# Patient Record
Sex: FEMALE | Race: BLACK OR AFRICAN AMERICAN | NOT HISPANIC OR LATINO | Employment: OTHER
[De-identification: names, ages, dates, MRNs, and addresses within clinical notes are randomized per-mention and may not be internally consistent; named-entity substitution may affect disease eponyms.]

---

## 2024-01-05 PROBLEM — E11.9 NEW ONSET TYPE 2 DIABETES MELLITUS (MULTI): Status: ACTIVE | Noted: 2018-12-19

## 2024-01-05 PROBLEM — E66.3 OVERWEIGHT (BMI 25.0-29.9): Status: ACTIVE | Noted: 2020-09-17

## 2024-01-05 PROBLEM — R10.9 ABDOMINAL PAIN: Status: ACTIVE | Noted: 2018-11-06

## 2024-01-05 PROBLEM — N70.92 OVARIAN ABSCESS: Status: ACTIVE | Noted: 2018-11-09

## 2024-01-05 PROBLEM — Z98.890 S/P EXPLORATORY LAPAROTOMY: Status: ACTIVE | Noted: 2018-11-09

## 2024-01-05 PROBLEM — N83.202 CYST OF LEFT OVARY: Status: ACTIVE | Noted: 2018-11-06

## 2024-01-05 PROBLEM — D47.2 MONOCLONAL GAMMOPATHY: Status: ACTIVE | Noted: 2024-01-05

## 2024-01-05 PROBLEM — J45.21 MILD INTERMITTENT ASTHMA WITH ACUTE EXACERBATION (HHS-HCC): Status: ACTIVE | Noted: 2019-02-27

## 2024-01-05 PROBLEM — G47.33 OSA ON CPAP: Status: ACTIVE | Noted: 2018-10-17

## 2024-01-05 PROBLEM — N95.0 POSTMENOPAUSAL BLEEDING: Status: ACTIVE | Noted: 2019-03-25

## 2024-01-05 RX ORDER — MONTELUKAST SODIUM 10 MG/1
1 TABLET ORAL NIGHTLY
COMMUNITY
Start: 2019-12-03

## 2024-01-05 RX ORDER — IPRATROPIUM BROMIDE 21 UG/1
2 SPRAY, METERED NASAL
COMMUNITY
Start: 2019-12-03

## 2024-01-05 RX ORDER — ALBUTEROL SULFATE 90 UG/1
1-2 AEROSOL, METERED RESPIRATORY (INHALATION) EVERY 4 HOURS PRN
COMMUNITY
Start: 2021-01-12

## 2024-01-05 RX ORDER — ALBUTEROL SULFATE 0.83 MG/ML
2.5 SOLUTION RESPIRATORY (INHALATION) EVERY 4 HOURS PRN
COMMUNITY
Start: 2019-03-22

## 2024-01-05 RX ORDER — METFORMIN HYDROCHLORIDE 500 MG/1
500 TABLET ORAL DAILY
COMMUNITY

## 2024-01-05 RX ORDER — ATORVASTATIN CALCIUM 20 MG/1
20 TABLET, FILM COATED ORAL
COMMUNITY
Start: 2023-04-03

## 2024-01-05 RX ORDER — ONDANSETRON 4 MG/1
4 TABLET, FILM COATED ORAL
COMMUNITY
Start: 2018-11-06

## 2024-01-05 RX ORDER — DICLOFENAC SODIUM 10 MG/G
2 GEL TOPICAL 4 TIMES DAILY
COMMUNITY
Start: 2022-12-14

## 2024-01-05 RX ORDER — ASPIRIN 325 MG
1 TABLET, DELAYED RELEASE (ENTERIC COATED) ORAL
COMMUNITY
Start: 2020-12-22

## 2024-01-05 RX ORDER — LOSARTAN POTASSIUM AND HYDROCHLOROTHIAZIDE 12.5; 5 MG/1; MG/1
TABLET ORAL
COMMUNITY
Start: 2023-02-21

## 2024-01-17 ENCOUNTER — TELEPHONE (OUTPATIENT)
Dept: SCHEDULING | Age: 69
End: 2024-01-17
Payer: MEDICARE

## 2024-01-17 ENCOUNTER — APPOINTMENT (OUTPATIENT)
Dept: HEMATOLOGY/ONCOLOGY | Facility: CLINIC | Age: 69
End: 2024-01-17
Payer: MEDICARE

## 2024-01-17 NOTE — TELEPHONE ENCOUNTER
Phone call to Cathy, no answer, left a msg for return call  Cathy returned my call, she does want to be seen earlier than March because she just had a diagnostic mammogram and US which showed a small mass. She refused biopsy at that time stating she wants to speak with an oncologist before she agrees to move forward but she doesn't want chemo.  Explained that our office had cancellations today, she is willing to come to the office today at 1330. Spoke with PAS to schedule her today

## 2024-02-21 ENCOUNTER — TELEPHONE (OUTPATIENT)
Dept: HEMATOLOGY/ONCOLOGY | Facility: CLINIC | Age: 69
End: 2024-02-21
Payer: MEDICARE

## 2024-02-21 DIAGNOSIS — D47.2 MONOCLONAL GAMMOPATHY: Primary | ICD-10-CM

## 2024-02-21 NOTE — TELEPHONE ENCOUNTER
Received message from  that pt called requesting lab orders be mailed to her home. Lab orders printed and mailed as requested.

## 2024-03-18 ENCOUNTER — OFFICE VISIT (OUTPATIENT)
Dept: HEMATOLOGY/ONCOLOGY | Facility: CLINIC | Age: 69
End: 2024-03-18
Payer: MEDICARE

## 2024-03-18 VITALS
WEIGHT: 153.44 LBS | TEMPERATURE: 97.7 F | HEART RATE: 83 BPM | HEIGHT: 61 IN | RESPIRATION RATE: 16 BRPM | OXYGEN SATURATION: 98 % | DIASTOLIC BLOOD PRESSURE: 73 MMHG | BODY MASS INDEX: 28.97 KG/M2 | SYSTOLIC BLOOD PRESSURE: 134 MMHG

## 2024-03-18 DIAGNOSIS — D47.2 MONOCLONAL GAMMOPATHY: Primary | ICD-10-CM

## 2024-03-18 PROCEDURE — 99214 OFFICE O/P EST MOD 30 MIN: CPT | Performed by: INTERNAL MEDICINE

## 2024-03-18 PROCEDURE — 3075F SYST BP GE 130 - 139MM HG: CPT | Performed by: INTERNAL MEDICINE

## 2024-03-18 PROCEDURE — 1160F RVW MEDS BY RX/DR IN RCRD: CPT | Performed by: INTERNAL MEDICINE

## 2024-03-18 PROCEDURE — 3078F DIAST BP <80 MM HG: CPT | Performed by: INTERNAL MEDICINE

## 2024-03-18 PROCEDURE — 1126F AMNT PAIN NOTED NONE PRSNT: CPT | Performed by: INTERNAL MEDICINE

## 2024-03-18 PROCEDURE — 1159F MED LIST DOCD IN RCRD: CPT | Performed by: INTERNAL MEDICINE

## 2024-03-18 ASSESSMENT — PATIENT HEALTH QUESTIONNAIRE - PHQ9
1. LITTLE INTEREST OR PLEASURE IN DOING THINGS: NOT AT ALL
SUM OF ALL RESPONSES TO PHQ9 QUESTIONS 1 AND 2: 0
2. FEELING DOWN, DEPRESSED OR HOPELESS: NOT AT ALL

## 2024-03-18 ASSESSMENT — COLUMBIA-SUICIDE SEVERITY RATING SCALE - C-SSRS
6. HAVE YOU EVER DONE ANYTHING, STARTED TO DO ANYTHING, OR PREPARED TO DO ANYTHING TO END YOUR LIFE?: NO
1. IN THE PAST MONTH, HAVE YOU WISHED YOU WERE DEAD OR WISHED YOU COULD GO TO SLEEP AND NOT WAKE UP?: NO
2. HAVE YOU ACTUALLY HAD ANY THOUGHTS OF KILLING YOURSELF?: NO

## 2024-03-18 ASSESSMENT — ENCOUNTER SYMPTOMS
OCCASIONAL FEELINGS OF UNSTEADINESS: 0
DEPRESSION: 0
LOSS OF SENSATION IN FEET: 0

## 2024-03-18 ASSESSMENT — PAIN SCALES - GENERAL: PAINLEVEL: 0-NO PAIN

## 2024-03-18 NOTE — PATIENT INSTRUCTIONS
Follow up with Dr. Moeller in 1 year.     You were given a prescription to have lab work completed prior to appointment. Please have results faxed to office at [phone]. (Copy also scanned to chart)

## 2024-03-18 NOTE — PROGRESS NOTES
Patient Visit Information:   Visit Type: Follow Up Visit      History of Present Illness:      ID Statement:    TOMY LIVE is a 68 year old Female        Chief Complaint: Follow-up MGUS   Interval History:    3/18/24  She returns today for follow-up of MGUS.  She feels well.  She complains of chronic knee pain, worse on the left, which does affect her ability to exercise and walk.   Otherwise, she feels well.  She denies any fever, chills, bleeding, night sweats, new or worsening musculoskeletal pain, neurologic symptoms or any other concerns.  She remains very active.       Past medical history: IgG lambda MGUS, dx 2014: Normal IgG, IgA, IgM levels.  Urine JATIN: No M protein. B2M: 1.4. Skeletal survey: No lytic lesions.   Serum IFEs have shown small M proteins ranging from 0.3 to 0.6 g/dL over past 8 years:     June 2021: M protein 0.6 g/dL.  August 2022: M protein 0.6 g/dL.  Normal creatinine, calcium.  3/20/2023: M protein: 0.6 g/dL.  IgG, IgA, IgM: Normal.  Kappa/lambda ratio: Normal.     History also positive for BSO in November 2018 for left ovarian abscess, chronic low back pain, arthritis in knees, GERD, hysterectomy in 1990, pertussis, left rib fracture, laser eye surgery.  Mammogram negative in December 2021.     Family medical history: Mother had leg cancer (lymphoma?) which was treated with surgery and chemotherapy, 2 uncles had prostate cancer.     Social history: Never smoker.  Drinks alcohol occasionally.     Review of Systems:   Review of Systems:    Constitutional: No fever, chills, night sweats.  Head and neck: No headaches or dizziness.  No pain or stiffness   HEENT: No sore throat or sinusitis.  Hearing is normal and eyesight is good.  Cardiac: No chest pain or palpitations  Respiratory: No increased dyspnea, cough, hemoptysis  GI: Appetite is good and weight stable.  No constipation, diarrhea.  No abdominal pain, nausea or vomiting.  Genitourinary: No frequency or urgency.  No polyuria,  dysuria.   Musculoskeletal: No worsening bone or joint pain.  Chronic arthralgias, especially in knees.    Endocrine: History diabetes, no thyroid disease.  Skin: No rash, skin lesions, itching.  Neuromuscular no fainting or dizziness.  No history of seizure.  No numbness or paresthesias. No focal weakness                 Allergies and Intolerances:       Allergies:         penicillin: Drug, Unknown, Active     Outpatient Medication Profile:  * Patient Currently Takes Medications as of 26-May-2023 10:13 documented in Structured Notes         ALOVERA : Last Dose Taken:  , 30 milliliter(s)  once a day         metFORMIN 500 mg oral tablet: Last Dose Taken:  , 1 tab(s) orally once  a day         losartan 25 mg oral tablet: Last Dose Taken:  , 1 tab(s) orally once  a day         Prenatal 1 oral capsule: Last Dose Taken:  , 1 cap(s) orally once a day         Voltaren 1% topical gel: Last Dose Taken:  , Apply topically to affected  area once a day, As Needed             Medical History:         Monoclonal gammopathy of unknown significance: ICD-10: D47.2,  Status: Active     Family History: No Family History items are recorded  in the problem list.      Social History:   Social Substance History:  ·  Smoking Status never smoker   ·  Tobacco Use denies   ·  Alcohol Use occasionally            Vitals and Measurements:   Vitals: Temp: 36.8  HR: 74  RR: 16  BP: 122/80  SPO2%:   96   Measurements: HT(cm): 154.8  WT(kg): 70.8  BSA: 1.74   BMI:  29.5      Physical Exam:      Constitutional: Well developed, awake/alert/oriented  x3, no distress, alert and cooperative.   Eyes: PERRL, EOMI, clear sclera.   ENMT: No external lesions noted.   Head/Neck: Neck supple, no apparent injury. Thyroid  grossly normal.  No mass, adenopathy or tenderness.  No JVD. Trachea midline.   Respiratory/Thorax: Thorax symmetric. Clear to auscultation.   No wheezes, rales, rhonchi.   Cardiovascular: Regular, rate and rhythm. No murmurs.   Normal S1, S2.    Gastrointestinal: Nondistended.  Normal bowel sounds.   Soft, non-tender. No rebound or guarding. No masses palpable.  No palpable hepatomegaly or splenomegaly.   Musculoskeletal: ROM intact.  No joint swelling.  Adequate strength. Mild degenerative changes.   Extremities: Normal extremities; no cyanosis, contusions,  wounds, clubbing, edema.   Neurological: Alert and oriented x3. Senses and cranial  nerves grossly intact. No focal motor or sensory deficits noted.   Lymphatic: No palpably significant lymphadenopathy.   Psychological: Appropriate mood and behavior.   Skin: Warm and dry, no rashes, no lesions.         Lab Results:     ·  Results        3/20/2023:   WBC 6.0.  Hemoglobin 13.3.  Platelets 251,000.   CMP: Normal.   IgG, IgA, IgM: Normal.   Kappa/lambda ratio: Normal.   SPE/JATIN: M protein: 0.6 g/dL.           Assessment and Plan:      Assessment and Plan:   Assessment:    1.  MGUS with history of very small IgG lambda monoclonal protein, stable over many years.     2.  Musculoskeletal pain due to degenerative disease/arthritis, clinically stable     3.  Multiple medical problems.     Plan: Clinical and laboratory findings discussed.  Since M protein has remained small and stable over multiple years, no intervention is necessary at this time.   I will repeat serum protein electrophoresis, serum light chains, IgG level after 1 year..  Pathophysiology MGUS discussed with the patient.  Reevaluation after 1 year.        Greater than 30 minutes spent discussing her condition, natural history of the disease, treatment, laboratory and imaging results, follow-up and documentation in EMR.

## 2025-03-18 ENCOUNTER — APPOINTMENT (OUTPATIENT)
Dept: HEMATOLOGY/ONCOLOGY | Facility: CLINIC | Age: 70
End: 2025-03-18
Payer: MEDICARE

## 2025-03-27 ENCOUNTER — OFFICE VISIT (OUTPATIENT)
Dept: HEMATOLOGY/ONCOLOGY | Facility: CLINIC | Age: 70
End: 2025-03-27
Payer: MEDICARE

## 2025-03-27 VITALS
WEIGHT: 160.16 LBS | BODY MASS INDEX: 30.32 KG/M2 | OXYGEN SATURATION: 100 % | DIASTOLIC BLOOD PRESSURE: 76 MMHG | HEART RATE: 85 BPM | SYSTOLIC BLOOD PRESSURE: 142 MMHG | RESPIRATION RATE: 16 BRPM | TEMPERATURE: 97.9 F

## 2025-03-27 DIAGNOSIS — D47.2 MONOCLONAL GAMMOPATHY: ICD-10-CM

## 2025-03-27 PROCEDURE — 99214 OFFICE O/P EST MOD 30 MIN: CPT | Performed by: INTERNAL MEDICINE

## 2025-03-27 PROCEDURE — 3078F DIAST BP <80 MM HG: CPT | Performed by: INTERNAL MEDICINE

## 2025-03-27 PROCEDURE — 1159F MED LIST DOCD IN RCRD: CPT | Performed by: INTERNAL MEDICINE

## 2025-03-27 PROCEDURE — 3077F SYST BP >= 140 MM HG: CPT | Performed by: INTERNAL MEDICINE

## 2025-03-27 PROCEDURE — 1126F AMNT PAIN NOTED NONE PRSNT: CPT | Performed by: INTERNAL MEDICINE

## 2025-03-27 PROCEDURE — 1160F RVW MEDS BY RX/DR IN RCRD: CPT | Performed by: INTERNAL MEDICINE

## 2025-03-27 ASSESSMENT — PAIN SCALES - GENERAL: PAINLEVEL_OUTOF10: 0-NO PAIN

## 2025-03-27 NOTE — PROGRESS NOTES
Patient Visit Information:   Visit Type: Follow Up Visit      History of Present Illness:      ID Statement:    TOMY LIVE is a 68 year old Female        Chief Complaint: Follow-up MGUS   Interval History:    3/27/24  She returns today for follow-up of MGUS.  She feels well.  She complains of chronic knee pain, worse on the left, which does affect her ability to exercise and walk.   Otherwise, she feels well.  She denies any fever, chills, bleeding, night sweats, new or worsening musculoskeletal pain, neurologic symptoms or any other concerns.  She remains very active.       Past medical history: IgG lambda MGUS, dx 2014: Normal IgG, IgA, IgM levels.  Urine JATIN: No M protein. B2M: 1.4. Skeletal survey: No lytic lesions.   Serum IFEs have shown small M proteins ranging from 0.3 to 0.6 g/dL over past 8 years:     June 2021: M protein 0.6 g/dL.  August 2022: M protein 0.6 g/dL.  Normal creatinine, calcium.  3/20/2023: M protein: 0.6 g/dL.  IgG, IgA, IgM: Normal.  Kappa/lambda ratio: Normal.     History also positive for BSO in November 2018 for left ovarian abscess, chronic low back pain, arthritis in knees, GERD, hysterectomy in 1990, pertussis, left rib fracture, laser eye surgery.  Mammogram negative in December 2021.     Family medical history: Mother had leg cancer (lymphoma?) which was treated with surgery and chemotherapy, 2 uncles had prostate cancer.     Social history: Never smoker.  Drinks alcohol occasionally.     Review of Systems:   Review of Systems:    Constitutional: No fever, chills, night sweats.  Head and neck: No headaches or dizziness.  No pain or stiffness   HEENT: No sore throat or sinusitis.  Hearing is normal and eyesight is good.  Cardiac: No chest pain or palpitations  Respiratory: No increased dyspnea, cough, hemoptysis  GI: Appetite is good and weight stable.  No constipation, diarrhea.  No abdominal pain, nausea or vomiting.  Genitourinary: No frequency or urgency.  No polyuria,  dysuria.   Musculoskeletal: No worsening bone or joint pain.  Chronic arthralgias, especially in knees.    Endocrine: History diabetes, no thyroid disease.  Skin: No rash, skin lesions, itching.  Neuromuscular no fainting or dizziness.  No history of seizure.  No numbness or paresthesias. No focal weakness                 Allergies and Intolerances:       Allergies:         penicillin: Drug, Unknown, Active     Outpatient Medication Profile:  * Patient Currently Takes Medications as of 26-May-2023 10:13 documented in Structured Notes         ALOVERA : Last Dose Taken:  , 30 milliliter(s)  once a day         metFORMIN 500 mg oral tablet: Last Dose Taken:  , 1 tab(s) orally once  a day         losartan 25 mg oral tablet: Last Dose Taken:  , 1 tab(s) orally once  a day         Prenatal 1 oral capsule: Last Dose Taken:  , 1 cap(s) orally once a day         Voltaren 1% topical gel: Last Dose Taken:  , Apply topically to affected  area once a day, As Needed             Medical History:         Monoclonal gammopathy of unknown significance: ICD-10: D47.2,  Status: Active     Family History: No Family History items are recorded  in the problem list.      Social History:   Social Substance History:  ·  Smoking Status never smoker   ·  Tobacco Use denies   ·  Alcohol Use occasionally            Vitals and Measurements:   Vitals: Temp: 36.8  HR: 74  RR: 16  BP: 122/80  SPO2%:   96   Measurements: HT(cm): 154.8  WT(kg): 70.8  BSA: 1.74   BMI:  29.5      Physical Exam:      Constitutional: Well developed, awake/alert/oriented  x3, no distress, alert and cooperative.   Eyes: PERRL, EOMI, clear sclera.   ENMT: No external lesions noted.   Head/Neck: Neck supple, no apparent injury. Thyroid  grossly normal.  No mass, adenopathy or tenderness.  No JVD. Trachea midline.   Respiratory/Thorax: Thorax symmetric. Clear to auscultation.   No wheezes, rales, rhonchi.   Cardiovascular: Regular, rate and rhythm. No murmurs.   Normal S1, S2.    Gastrointestinal: Nondistended.  Normal bowel sounds.   Soft, non-tender. No rebound or guarding. No masses palpable.  No palpable hepatomegaly or splenomegaly.   Musculoskeletal: ROM intact.  No joint swelling.  Adequate strength. Mild degenerative changes.   Extremities: Normal extremities; no cyanosis, contusions,  wounds, clubbing, edema.   Neurological: Alert and oriented x3. Senses and cranial  nerves grossly intact. No focal motor or sensory deficits noted.   Lymphatic: No palpably significant lymphadenopathy.   Psychological: Appropriate mood and behavior.   Skin: Warm and dry, no rashes, no lesions.         Lab Results:  IgG level and serum light chains have been stable  ·  Results         WBC count 5.7, hemoglobin 13.5, platelets 258, calcium 9.4, total protein 7.0        Assessment and Plan:      Assessment and Plan:   Assessment:    1.  MGUS with history of very small IgG lambda monoclonal protein, stable over many years.     2.  Musculoskeletal pain due to degenerative disease/arthritis, clinically stable     3.  Multiple medical problems.     Plan: Clinical and laboratory findings discussed.  Since M protein has remained small and stable over multiple years, no intervention is necessary at this time.   I will repeat serum protein electrophoresis, serum light chains, IgG level after 1 year..  Pathophysiology MGUS discussed with the patient.  Reevaluation after 1 year.        Greater than 30 minutes spent discussing her condition, natural history of the disease, treatment, laboratory and imaging results, follow-up and documentation in EMR.

## 2025-03-27 NOTE — PATIENT INSTRUCTIONS
Follow up visit for history of monoclonal gammopathy of unknown significance.     Follow up visit in 1-2 years.    Call prior to lab work to be mailed at least 2 weeks prior to having labs done.